# Patient Record
Sex: MALE | URBAN - NONMETROPOLITAN AREA
[De-identification: names, ages, dates, MRNs, and addresses within clinical notes are randomized per-mention and may not be internally consistent; named-entity substitution may affect disease eponyms.]

---

## 2018-05-14 ENCOUNTER — APPOINTMENT (OUTPATIENT)
Age: 73
Setting detail: DERMATOLOGY
End: 2018-05-14

## 2018-05-14 DIAGNOSIS — L82.1 OTHER SEBORRHEIC KERATOSIS: ICD-10-CM

## 2018-05-14 DIAGNOSIS — D22 MELANOCYTIC NEVI: ICD-10-CM

## 2018-05-14 DIAGNOSIS — Z85.828 PERSONAL HISTORY OF OTHER MALIGNANT NEOPLASM OF SKIN: ICD-10-CM

## 2018-05-14 DIAGNOSIS — L82.0 INFLAMED SEBORRHEIC KERATOSIS: ICD-10-CM

## 2018-05-14 DIAGNOSIS — L81.4 OTHER MELANIN HYPERPIGMENTATION: ICD-10-CM

## 2018-05-14 DIAGNOSIS — L57.0 ACTINIC KERATOSIS: ICD-10-CM

## 2018-05-14 DIAGNOSIS — L85.8 OTHER SPECIFIED EPIDERMAL THICKENING: ICD-10-CM

## 2018-05-14 PROBLEM — D48.5 NEOPLASM OF UNCERTAIN BEHAVIOR OF SKIN: Status: ACTIVE | Noted: 2018-05-14

## 2018-05-14 PROCEDURE — OTHER SHAVE REMOVAL: OTHER

## 2018-05-14 PROCEDURE — OTHER BIOPSY BY SHAVE METHOD: OTHER

## 2018-05-14 PROCEDURE — 11305 SHAVE SKIN LESION 0.5 CM/<: CPT

## 2018-05-14 PROCEDURE — 11101: CPT

## 2018-05-14 PROCEDURE — OTHER BIOPSY BY PUNCH METHOD: OTHER

## 2018-05-14 PROCEDURE — 11100: CPT | Mod: 59

## 2018-05-14 PROCEDURE — 99213 OFFICE O/P EST LOW 20 MIN: CPT | Mod: 25

## 2018-05-14 PROCEDURE — OTHER REASSURANCE: OTHER

## 2018-05-14 PROCEDURE — OTHER COUNSELING: OTHER

## 2018-05-14 ASSESSMENT — LOCATION SIMPLE DESCRIPTION DERM
LOCATION SIMPLE: CHEST
LOCATION SIMPLE: LEFT FOREHEAD
LOCATION SIMPLE: LEFT UPPER BACK
LOCATION SIMPLE: LEFT SCALP
LOCATION SIMPLE: LEFT SHOULDER
LOCATION SIMPLE: LEFT HAND
LOCATION SIMPLE: LEFT INFERIOR EYELID
LOCATION SIMPLE: SCALP
LOCATION SIMPLE: RIGHT FOREARM
LOCATION SIMPLE: UPPER BACK
LOCATION SIMPLE: RIGHT FOREHEAD
LOCATION SIMPLE: TRAPEZIAL NECK
LOCATION SIMPLE: RIGHT UPPER BACK
LOCATION SIMPLE: LEFT LOWER BACK
LOCATION SIMPLE: POSTERIOR SCALP
LOCATION SIMPLE: LEFT CLAVICULAR SKIN
LOCATION SIMPLE: RIGHT HAND

## 2018-05-14 ASSESSMENT — LOCATION ZONE DERM
LOCATION ZONE: NECK
LOCATION ZONE: EYELID
LOCATION ZONE: FACE
LOCATION ZONE: ARM
LOCATION ZONE: SCALP
LOCATION ZONE: TRUNK
LOCATION ZONE: HAND

## 2018-05-14 ASSESSMENT — LOCATION DETAILED DESCRIPTION DERM
LOCATION DETAILED: LEFT MID-UPPER BACK
LOCATION DETAILED: LEFT CLAVICULAR SKIN
LOCATION DETAILED: LEFT ULNAR DORSAL HAND
LOCATION DETAILED: RIGHT CENTRAL PARIETAL SCALP
LOCATION DETAILED: RIGHT ULNAR DORSAL HAND
LOCATION DETAILED: LEFT OCCIPITAL SCALP
LOCATION DETAILED: RIGHT SUPERIOR PARIETAL SCALP
LOCATION DETAILED: MID TRAPEZIAL NECK
LOCATION DETAILED: RIGHT VENTRAL DISTAL FOREARM
LOCATION DETAILED: LEFT RADIAL DORSAL HAND
LOCATION DETAILED: LEFT LATERAL FOREHEAD
LOCATION DETAILED: LEFT SUPERIOR UPPER BACK
LOCATION DETAILED: RIGHT RADIAL DORSAL HAND
LOCATION DETAILED: LEFT SUPERIOR MEDIAL MIDBACK
LOCATION DETAILED: LEFT LATERAL INFERIOR EYELID
LOCATION DETAILED: LEFT SUPERIOR MEDIAL UPPER BACK
LOCATION DETAILED: STERNUM
LOCATION DETAILED: RIGHT SUPERIOR LATERAL UPPER BACK
LOCATION DETAILED: LEFT POSTERIOR SHOULDER
LOCATION DETAILED: INFERIOR THORACIC SPINE
LOCATION DETAILED: RIGHT FOREHEAD
LOCATION DETAILED: LEFT MEDIAL FRONTAL SCALP
LOCATION DETAILED: RIGHT MEDIAL SUPERIOR CHEST
LOCATION DETAILED: RIGHT INFERIOR UPPER BACK

## 2018-05-14 NOTE — PROCEDURE: BIOPSY BY PUNCH METHOD
Epidermal Sutures: 4-0 Nylon
Size Of Lesion In Cm (Optional): 0
Billing Type: Third-Party Bill
Post-Care Instructions: I reviewed with the patient in detail post-care instructions. Patient is to keep the biopsy site dry overnight, and then apply bacitracin twice daily until healed. Patient may apply hydrogen peroxide soaks to remove any crusting.
Detail Level: Detailed
Consent: Written consent was obtained and risks were reviewed including but not limited to scarring, infection, bleeding, scabbing, incomplete removal, nerve damage and allergy to anesthesia.
Anesthesia Type: 1% lidocaine with 1:100,000 epinephrine
Punch Size In Mm: 3
Home Suture Removal Text: Patient was provided a home suture removal kit and will remove their sutures at home.  If they have any questions or difficulties they will call the office.
Render Post-Care Instructions In Note?: no
Anesthesia Volume In Cc (Will Not Render If 0): 0.5
Biopsy Type: H and E
Suture Removal: 14 days
Was A Bandage Applied: Yes
Hemostasis: None
Wound Care: Bacitracin
Notification Instructions: Patient will be notified of biopsy results. However, patient instructed to call the office if not contacted within 2 weeks.
Dressing: bandage

## 2018-05-14 NOTE — PROCEDURE: BIOPSY BY SHAVE METHOD
Electrodesiccation And Curettage Text: The wound bed was treated with electrodesiccation and curettage after the biopsy was performed.
Wound Care: Bacitracin
Type Of Destruction Used: Curettage
Anesthesia Type: 1% lidocaine with epinephrine and a 1:10 solution of 8.4% sodium bicarbonate
Notification Instructions: Patient will be notified of biopsy results. However, patient instructed to call the office if not contacted within 2 weeks.
Post-Care Instructions: I reviewed with the patient in detail post-care instructions. Patient is to keep the biopsy site dry overnight, and then apply bacitracin twice daily until healed. Patient may apply hydrogen peroxide soaks to remove any crusting.
Destruction After The Procedure: No
Anesthesia Volume In Cc (Will Not Render If 0): 0.5
Was A Bandage Applied: Yes
Electrodesiccation Text: The wound bed was treated with electrodesiccation after the biopsy was performed.
Biopsy Method: 15 blade
X Size Of Lesion In Cm: 0
Billing Type: Third-Party Bill
Dressing: bandage
Detail Level: Detailed
Consent: Written consent was obtained and risks were reviewed including but not limited to scarring, infection, bleeding, scabbing, incomplete removal, nerve damage and allergy to anesthesia.
Curettage Text: The wound bed was treated with curettage after the biopsy was performed.
Biopsy Type: H and E
Cryotherapy Text: The wound bed was treated with cryotherapy after the biopsy was performed.
Silver Nitrate Text: The wound bed was treated with silver nitrate after the biopsy was performed.
Hemostasis: Aluminum Chloride

## 2018-05-24 ENCOUNTER — APPOINTMENT (OUTPATIENT)
Age: 73
Setting detail: DERMATOLOGY
End: 2018-05-24

## 2018-05-24 DIAGNOSIS — L57.0 ACTINIC KERATOSIS: ICD-10-CM

## 2018-05-24 DIAGNOSIS — Z48.02 ENCOUNTER FOR REMOVAL OF SUTURES: ICD-10-CM

## 2018-05-24 PROBLEM — C44.119 BASAL CELL CARCINOMA OF SKIN OF LEFT EYELID, INCLUDING CANTHUS: Status: ACTIVE | Noted: 2018-05-24

## 2018-05-24 PROBLEM — D04.62 CARCINOMA IN SITU OF SKIN OF LEFT UPPER LIMB, INCLUDING SHOULDER: Status: ACTIVE | Noted: 2018-05-24

## 2018-05-24 PROBLEM — D04.4 CARCINOMA IN SITU OF SKIN OF SCALP AND NECK: Status: ACTIVE | Noted: 2018-05-24

## 2018-05-24 PROBLEM — C44.42 SQUAMOUS CELL CARCINOMA OF SKIN OF SCALP AND NECK: Status: ACTIVE | Noted: 2018-05-24

## 2018-05-24 PROCEDURE — OTHER CONSULTATION FOR MOHS SURGERY: OTHER

## 2018-05-24 PROCEDURE — OTHER ADDITIONAL NOTES: OTHER

## 2018-05-24 PROCEDURE — OTHER LIQUID NITROGEN: OTHER

## 2018-05-24 PROCEDURE — 17271 DSTR MAL LES S/N/H/F/G 0.6-1: CPT | Mod: 59

## 2018-05-24 PROCEDURE — 99024 POSTOP FOLLOW-UP VISIT: CPT | Mod: 25

## 2018-05-24 PROCEDURE — 17000 DESTRUCT PREMALG LESION: CPT

## 2018-05-24 PROCEDURE — 99024 POSTOP FOLLOW-UP VISIT: CPT

## 2018-05-24 PROCEDURE — OTHER POST-OP WOUND EVALUATION: OTHER

## 2018-05-24 PROCEDURE — 17272 DSTR MAL LES S/N/H/F/G 1.1-2: CPT | Mod: 59

## 2018-05-24 PROCEDURE — OTHER MIPS QUALITY: OTHER

## 2018-05-24 PROCEDURE — OTHER SUTURE REMOVAL (GLOBAL PERIOD): OTHER

## 2018-05-24 PROCEDURE — OTHER CRYOSURGICAL DESTRUCTION: OTHER

## 2018-05-24 ASSESSMENT — LOCATION ZONE DERM
LOCATION ZONE: SCALP
LOCATION ZONE: EYELID
LOCATION ZONE: HAND
LOCATION ZONE: ARM

## 2018-05-24 ASSESSMENT — LOCATION DETAILED DESCRIPTION DERM
LOCATION DETAILED: LEFT ULNAR DORSAL HAND
LOCATION DETAILED: LEFT MEDIAL FRONTAL SCALP
LOCATION DETAILED: RIGHT VENTRAL DISTAL FOREARM
LOCATION DETAILED: LEFT LATERAL INFERIOR EYELID
LOCATION DETAILED: RIGHT CENTRAL PARIETAL SCALP

## 2018-05-24 ASSESSMENT — LOCATION SIMPLE DESCRIPTION DERM
LOCATION SIMPLE: SCALP
LOCATION SIMPLE: LEFT INFERIOR EYELID
LOCATION SIMPLE: LEFT HAND
LOCATION SIMPLE: RIGHT FOREARM
LOCATION SIMPLE: LEFT SCALP

## 2018-05-24 NOTE — PROCEDURE: CRYOSURGICAL DESTRUCTION
Total Time In Minutes: 3 minutes
Total Volume (Ccs): 1
Size Of Lesion In Cm: 0.7
Post-Care Instructions: I reviewed with the patient in detail post-care instructions. Patient is to keep the area dry for 48 hours, and not to engage in any heavy lifting, exercise, or swimming for the next 14 days. Should the patient develop any fevers, chills, bleeding, severe pain patient will contact the office immediately.
Anesthesia Volume In Cc: 0
Add Intralesional Injection: No
Detail Level: Detailed
Medication Injected: 5-Fluorouracil
Number Of Freeze-Thaw Cycles: 3 freeze-thaw cycles
Size Of Lesion In Cm: 2
Bill As A Line Item Or As Units: Line Item
Additional Information: (Optional): The wound was cleaned, and a dressing was applied.  The patient received detailed post-op instructions.
Consent was obtained from the patient. The risks and benefits to therapy were discussed in detail. Specifically, the risks of infection, scarring, bleeding, prolonged wound healing, incomplete removal, allergy to anesthesia, nerve injury and recurrence were addressed. Alternatives to liquid nitrogen, such as ED&C, surgical removal, XRT were also discussed.  Prior to the procedure, the treatment site was clearly identified and confirmed by the patient. All components of Universal Protocol/PAUSE Rule completed.
Pre-Procedure: The surgical site was antiseptically prepared.

## 2018-05-24 NOTE — PROCEDURE: POST-OP WOUND EVALUATION
Wound Crusting?: clean
Follow Up Units (Optional): -
Detail Level: Detailed
Wound Diameter In Cm(Optional): 0
Add 30781 Cpt? (Important Note: In 2017 The Use Of 76464 Is Being Tracked By Cms To Determine Future Global Period Reimbursement For Global Periods): no

## 2018-05-24 NOTE — PROCEDURE: SUTURE REMOVAL (GLOBAL PERIOD)
Add 50870 Cpt? (Important Note: In 2017 The Use Of 55532 Is Being Tracked By Cms To Determine Future Global Period Reimbursement For Global Periods): yes
Detail Level: Detailed

## 2018-05-24 NOTE — PROCEDURE: LIQUID NITROGEN
Render Post-Care Instructions In Note?: yes
Detail Level: Detailed
Number Of Freeze-Thaw Cycles: 1 freeze-thaw cycle
Consent: The patient's consent was obtained including but not limited to risks of crusting, scabbing, blistering, scarring, darker or lighter pigmentary change, recurrence, incomplete removal and infection.
Duration Of Freeze Thaw-Cycle (Seconds): 0
Post-Care Instructions: I reviewed with the patient in detail post-care instructions. Patient is to wear sunprotection, and avoid picking at any of the treated lesions. Pt may apply Vaseline to crusted or scabbing areas.

## 2018-06-04 ENCOUNTER — APPOINTMENT (OUTPATIENT)
Age: 73
Setting detail: DERMATOLOGY
End: 2018-06-04

## 2018-06-04 ENCOUNTER — APPOINTMENT (OUTPATIENT)
Age: 73
Setting detail: DERMATOLOGY
End: 2018-06-13

## 2018-06-04 PROBLEM — C44.42 SQUAMOUS CELL CARCINOMA OF SKIN OF SCALP AND NECK: Status: ACTIVE | Noted: 2018-06-04

## 2018-06-04 PROCEDURE — 17311 MOHS 1 STAGE H/N/HF/G: CPT

## 2018-06-04 PROCEDURE — 13121 CMPLX RPR S/A/L 2.6-7.5 CM: CPT

## 2018-06-04 PROCEDURE — OTHER REPAIR NOTE: OTHER

## 2018-06-04 PROCEDURE — OTHER MOHS SURGERY: OTHER

## 2018-06-04 PROCEDURE — OTHER REFERRAL: OTHER

## 2018-06-04 PROCEDURE — OTHER PRESCRIPTION: OTHER

## 2018-06-04 RX ORDER — CEPHALEXIN 500 MG/1
CAPSULE ORAL
Qty: 20 | Refills: 0 | Status: ERX | COMMUNITY
Start: 2018-06-04

## 2018-06-04 RX ORDER — MUPIROCIN 20 MG/G
OINTMENT TOPICAL
Qty: 1 | Refills: 0 | Status: ERX | COMMUNITY
Start: 2018-06-04

## 2018-06-18 ENCOUNTER — APPOINTMENT (OUTPATIENT)
Age: 73
Setting detail: DERMATOLOGY
End: 2018-06-18

## 2018-06-18 PROBLEM — C44.42 SQUAMOUS CELL CARCINOMA OF SKIN OF SCALP AND NECK: Status: ACTIVE | Noted: 2018-06-18

## 2018-06-18 PROBLEM — D04.62 CARCINOMA IN SITU OF SKIN OF LEFT UPPER LIMB, INCLUDING SHOULDER: Status: ACTIVE | Noted: 2018-06-18

## 2018-06-18 PROCEDURE — 17311 MOHS 1 STAGE H/N/HF/G: CPT

## 2018-06-18 PROCEDURE — 13132 CMPLX RPR F/C/C/M/N/AX/G/H/F: CPT

## 2018-06-18 PROCEDURE — OTHER REPAIR NOTE: OTHER

## 2018-06-18 PROCEDURE — OTHER MOHS SURGERY: OTHER

## 2018-06-18 PROCEDURE — OTHER REFERRAL: OTHER

## 2018-06-18 PROCEDURE — OTHER STAPLE REMOVAL: OTHER

## 2018-06-18 NOTE — PROCEDURE: MOHS SURGERY
Graft Cartilage Fenestration Text: The cartilage was fenestrated with a 2mm punch biopsy to help facilitate graft survival and healing.

## 2018-06-18 NOTE — PROCEDURE: MOHS SURGERY
Non-Graft Cartilage Fenestration Text: The cartilage was fenestrated with a 2mm punch biopsy to help facilitate healing.

## 2018-06-18 NOTE — PROCEDURE: STAPLE REMOVAL
Detail Level: Detailed
Add 88182 Cpt? (Important Note: In 2017 The Use Of 75688 Is Being Tracked By Cms To Determine Future Global Period Reimbursement For Global Periods): no

## 2018-07-02 ENCOUNTER — APPOINTMENT (OUTPATIENT)
Age: 73
Setting detail: DERMATOLOGY
End: 2018-07-02

## 2018-07-02 DIAGNOSIS — Z48.02 ENCOUNTER FOR REMOVAL OF SUTURES: ICD-10-CM

## 2018-07-02 PROCEDURE — OTHER SUTURE REMOVAL (GLOBAL PERIOD): OTHER

## 2018-07-02 PROCEDURE — 99024 POSTOP FOLLOW-UP VISIT: CPT

## 2018-07-02 ASSESSMENT — LOCATION DETAILED DESCRIPTION DERM: LOCATION DETAILED: LEFT ULNAR DORSAL HAND

## 2018-07-02 ASSESSMENT — LOCATION ZONE DERM: LOCATION ZONE: HAND

## 2018-07-02 ASSESSMENT — LOCATION SIMPLE DESCRIPTION DERM: LOCATION SIMPLE: LEFT HAND

## 2018-07-02 NOTE — PROCEDURE: SUTURE REMOVAL (GLOBAL PERIOD)
Add 82135 Cpt? (Important Note: In 2017 The Use Of 25814 Is Being Tracked By Cms To Determine Future Global Period Reimbursement For Global Periods): yes
Detail Level: Detailed

## 2018-11-21 ENCOUNTER — BCC (OUTPATIENT)
Dept: URBAN - METROPOLITAN AREA CLINIC 27 | Facility: CLINIC | Age: 73
Setting detail: DERMATOLOGY
End: 2018-11-21

## 2018-11-21 DIAGNOSIS — C44.329 SQUAMOUS CELL CARCINOMA OF SKIN OF OTHER PARTS OF FACE: ICD-10-CM

## 2018-11-21 PROBLEM — C44.41 BASAL CELL CARCINOMA OF SKIN OF SCALP AND NECK: Status: RESOLVED | Noted: 2018-11-21

## 2018-11-21 PROCEDURE — 88331 PATH CONSLTJ SURG 1 BLK 1SPC: CPT

## 2018-11-21 PROCEDURE — 11100 BIOPSY - SKINSUBCUT.TISSUE/MUC: CPT

## 2018-11-21 PROCEDURE — 99204 OFFICE O/P NEW MOD 45 MIN: CPT

## 2018-11-21 PROCEDURE — 17311 MOHS 1 STAGE H/N/HF/G: CPT

## 2018-11-21 PROCEDURE — 11101 BIOPSY-EACH SEPARATE/ADDIT LES: CPT

## 2018-11-25 NOTE — PROCEDURE: REPAIR NOTE
Discussion/Summary   Urine test with no infection, keep appointment with urology.         Dorsal Nasal Flap Text: The defect edges were debeveled with a #15 scalpel blade.  Given the location of the defect and the proximity to free margins a dorsal nasal flap was deemed most appropriate.  Using a sterile surgical marker, an appropriate dorsal nasal flap was drawn around the defect.    The area thus outlined was incised deep to adipose tissue with a #15 scalpel blade.  The skin margins were undermined to an appropriate distance in all directions utilizing iris scissors.